# Patient Record
Sex: FEMALE | Race: WHITE | Employment: STUDENT | URBAN - METROPOLITAN AREA
[De-identification: names, ages, dates, MRNs, and addresses within clinical notes are randomized per-mention and may not be internally consistent; named-entity substitution may affect disease eponyms.]

---

## 2017-06-02 ENCOUNTER — HOSPITAL ENCOUNTER (OUTPATIENT)
Dept: GENERAL RADIOLOGY | Age: 22
Discharge: HOME | End: 2017-06-02

## 2017-06-02 ENCOUNTER — HOSPITAL ENCOUNTER (EMERGENCY)
Age: 22
Discharge: HOME | End: 2017-06-02
Attending: EMERGENCY MEDICINE
Payer: COMMERCIAL

## 2017-06-02 VITALS
HEIGHT: 64 IN | OXYGEN SATURATION: 98 % | TEMPERATURE: 98.1 F | HEART RATE: 67 BPM | BODY MASS INDEX: 26.8 KG/M2 | SYSTOLIC BLOOD PRESSURE: 128 MMHG | WEIGHT: 157 LBS | RESPIRATION RATE: 16 BRPM | DIASTOLIC BLOOD PRESSURE: 85 MMHG

## 2017-06-02 DIAGNOSIS — S92.215A CLOSED NONDISPLACED FRACTURE OF CUBOID OF LEFT FOOT, INITIAL ENCOUNTER: Primary | ICD-10-CM

## 2017-06-02 DIAGNOSIS — M79.672 LEFT FOOT PAIN: ICD-10-CM

## 2017-06-02 PROCEDURE — 99283 EMERGENCY DEPT VISIT LOW MDM: CPT

## 2017-06-02 PROCEDURE — 77030013175 HC SHOE PSTOP DJOR -A

## 2017-06-02 PROCEDURE — 73630 X-RAY EXAM OF FOOT: CPT

## 2017-06-02 PROCEDURE — L4360 PNEUMAT WALKING BOOT PRE CST: HCPCS

## 2017-06-02 RX ORDER — OXYCODONE HYDROCHLORIDE 5 MG/1
5 CAPSULE ORAL
Qty: 20 CAP | Refills: 0 | Status: SHIPPED | OUTPATIENT
Start: 2017-06-02 | End: 2017-06-07

## 2017-06-02 RX ORDER — DOCUSATE SODIUM 100 MG/1
100 CAPSULE, LIQUID FILLED ORAL 2 TIMES DAILY
Qty: 60 CAP | Refills: 0 | Status: SHIPPED | OUTPATIENT
Start: 2017-06-02 | End: 2017-07-02

## 2017-06-02 NOTE — DISCHARGE INSTRUCTIONS
We hope that we have addressed all of your medical concerns. The examination and treatment you received in the Emergency Department were for an emergent problem and were not intended as complete care. It is important that you follow up with your healthcare provider(s) for ongoing care. If your symptoms worsen or do not improve as expected, and you are unable to reach your usual health care provider(s), you should return to the Emergency Department. Today's healthcare is undergoing tremendous change, and patient satisfaction surveys are one of the many tools to assess the quality of medical care. You may receive a survey from the [employer] regarding your experience in the Emergency Department. I hope that your experience has been completely positive, particularly the medical care that I provided. As such, please participate in the survey; anything less than excellent does not meet my expectations or intentions. formerly [facility] and [address] [place] [address] participate in nationally recognized quality of care measures. If your blood pressure is greater than 120/80, as reported below, we urge that you seek medical care to address the potential of high blood pressure, commonly known as hypertension. Hypertension can be hereditary or can be caused by certain medical conditions, pain, stress, or \"white coat syndrome. \"       Please make an appointment with your health care provider(s) for follow up of your Emergency Department visit. VITALS:   Patient Vitals for the past 8 hrs:   Temp Pulse Resp BP SpO2   06/02/17 1303 98.1 °F (36.7 °C) 67 16 128/85 98 %          Thank you for allowing us to provide you with medical care today. We realize that you have many choices for your emergency care needs. Please choose us in the future for any continued health care needs. [place] Richard  DELIA/ Jaime [address]all[address], [address] [facility] [address]   Office: [phone]            No results found for this or any previous visit (from the past 24 hour(s)). Xr Foot Lt Min 3 V    Result Date: 6/2/2017  Indication: Left foot pain after injury pain mostly on lateral side. COMPARISON: None Three views of the left foot were performed. There is normal mineralization. Joint spaces are maintained. There is a linear lucency through the lateral aspect of the cuboid. This runs parallel to the length of the bone. This is most consistent with nondisplaced fracture. Dr. Gwen Jimenez office was telephoned. Tiny calcific density projects along the superior and medial aspect of the navicular bone that could be a small evulsion fracture or accessory ossicle. IMPRESSION: 1. Nondisplaced fracture through the lateral aspect of the cuboid bone. This fracture line extends to the joint space between the cuboid and fifth metatarsal. Please see above text. Dr. Jessica Martinez office was telephoned             Bones of the Foot: Anatomy Sketch    Current as of: January 5, 2017  Content Version: 11.2  © 3924-8819 [employer]. Care instructions adapted under license by [employer] (which disclaims liability or warranty for this information). If you have questions about a medical condition or this instruction, always ask your healthcare professional. [address] any warranty or liability for your use of this information. Broken Foot: Care Instructions  Your Care Instructions    A broken foot, or foot fracture, is a break in one or more of the bones in your foot. It may happen because of a sports injury, a fall, or other accident. A compound, or open, fracture occurs when a bone breaks through the skin. A break that does not poke through the skin is a closed fracture. Your treatment depends on the location and type of break in your foot. You may need a splint, a cast, or an orthopedic shoe. Certain kinds of injuries may need surgery at some time.  Whatever your treatment, you can ease symptoms and help your foot heal with care at home. You may need 6 to 8 weeks or more to fully heal.  You heal best when you take good care of yourself. Eat a variety of healthy foods, and don't smoke. Follow-up care is a key part of your treatment and safety. Be sure to make and go to all appointments, and call your doctor if you are having problems. It's also a good idea to know your test results and keep a list of the medicines you take. How can you care for yourself at home? · Be safe with medicines. Take pain medicines exactly as directed. ¨ If the doctor gave you a prescription medicine for pain, take it as prescribed. ¨ If you are not taking a prescription pain medicine, ask your doctor if you can take an over-the-counter medicine. · Leave the splint on until your follow-up appointment. Do not put any weight on the injured foot. If you were given crutches, use them as directed. · Put ice or a cold pack on your foot for 10 to 20 minutes at a time. Try to do this every 1 to 2 hours for the next 3 days (when you are awake) or until the swelling goes down. Put a thin cloth between the ice and your skin. · Prop up the sore foot on a pillow anytime you sit or lie down during the next 3 days. Try to keep it above the level of your heart. This will help reduce swelling. · Follow the cast care instructions your doctor gives you. If you have a splint, do not take it off unless your doctor tells you to. Cast and splint care  · If you have a removable splint, ask your doctor if it is okay to remove it to bathe. Your doctor may want you to keep it on as much as possible. · Keep your plaster splint covered by taping a sheet of plastic around it when you bathe. Water under the plaster can cause your skin to itch and hurt. · Never cut your splint. When should you call for help? Call 911 anytime you think you may need emergency care.  For example, call if:  · You have sudden chest pain and shortness of breath, or you cough up blood. Call your doctor now or seek immediate medical care if:  · You have increased or severe pain. · Your toes are cool or pale or change color. · You have tingling, weakness, or numbness in your foot. · Your cast or splint feels too tight. · You cannot move your toes. · You have signs of a blood clot, such as:  ¨ Pain in your calf, back of the knee, thigh, or groin. ¨ Redness or swelling in your leg or groin. Watch closely for changes in your health, and be sure to contact your doctor if:  · Your pain is not better in 2 to 3 days. Where can you learn more? Go to http://pari-jcaob.info/. Enter T575 in the search box to learn more about \"Broken Foot: Care Instructions. \"  Current as of: May 23, 2016  Content Version: 11.2  © 8057-3432 [employer]. Care instructions adapted under license by [employer] (which disclaims liability or warranty for this information). If you have questions about a medical condition or this instruction, always ask your healthcare professional. [place] [address] any warranty or liability for your use of this information.

## 2017-06-02 NOTE — CONSULTS
Ortho Phone consult:  Consulted by ED to review films and discuss treatment and f/u plans. Pt injured left foot in [place]. X-rays obtained here reveal a closed non-displaced left cuboid fracture. Recommend post op shoe or CAM boot walker and crutches. Follow up with Arvin Mccormick next week.   Ice and elevation, rest.

## 2017-06-05 NOTE — ED PROVIDER NOTES
HPI Comments: Alex Alvarenga is a 24 y.o. female who presents ambulatory with an unsteady gait to the ED with  c/o left foot pain. Patient was in [place] on vacation with her family when she was snow tubing. Patient states when she got to the bottom of the hill she \"hot her foot just right and it immediately began to swell. \" Patient wrapped in an ace wrap and flew back to [place], where she now presents for care. Patient states pain 10/10 currently when she walks on it. PCP: Linda Costa MD    PMHx significant for: History reviewed. No pertinent past medical history. PSHx significant for: History reviewed. No pertinent surgical history. Social Hx: Tobacco: none EtOH: none Illicit drug use: none    There are no further complaints or symptoms at this time. The history is provided by the patient. History reviewed. No pertinent past medical history. History reviewed. No pertinent surgical history. History reviewed. No pertinent family history. Social History     Social History    Marital status: SINGLE     Spouse name: N/A    Number of children: N/A    Years of education: N/A     Occupational History    Not on file. Social History Main Topics    Smoking status: Never Smoker    Smokeless tobacco: Not on file    Alcohol use No    Drug use: Not on file    Sexual activity: Not on file     Other Topics Concern    Not on file     Social History Narrative    No narrative on file         ALLERGIES: Review of patient's allergies indicates no known allergies. Review of Systems   Constitutional: Negative for activity change, chills and fever. HENT: Negative for congestion and ear pain. Eyes: Negative for pain and visual disturbance. Respiratory: Negative for chest tightness and shortness of breath. Cardiovascular: Negative for chest pain and palpitations. Gastrointestinal: Negative for abdominal distention, abdominal pain, nausea and vomiting. Genitourinary: Negative for difficulty urinating, frequency, menstrual problem and urgency. Musculoskeletal: Positive for joint swelling (left foot swelling, positive pulses noted in left foot). Negative for back pain, neck pain and neck stiffness. Skin: Negative for color change. Neurological: Negative for dizziness, syncope, weakness and headaches. Hematological: Does not bruise/bleed easily. Psychiatric/Behavioral: Negative for behavioral problems. The patient is not nervous/anxious. Vitals:    06/02/17 1303   BP: 128/85   Pulse: 67   Resp: 16   Temp: 98.1 °F (36.7 °C)   SpO2: 98%   Weight: 71.2 kg (157 lb)   Height: 5' 4\" (1.626 m)            Physical Exam   Constitutional: She is oriented to person, place, and time. She appears well-developed and well-nourished. No distress. HENT:   Head: Normocephalic and atraumatic. Right Ear: External ear normal.   Left Ear: External ear normal.   Eyes: Conjunctivae and EOM are normal. Pupils are equal, round, and reactive to light. Right eye exhibits no discharge. Left eye exhibits no discharge. Neck: Normal range of motion. Neck supple. No tracheal deviation present. No cervical spine pain on palpation. No neurological deficits   Cardiovascular: Normal rate, regular rhythm, normal heart sounds and intact distal pulses. No murmur heard. Pulmonary/Chest: Effort normal and breath sounds normal. No respiratory distress. She exhibits no tenderness. Abdominal: Soft. Bowel sounds are normal. She exhibits no distension. There is no tenderness. Musculoskeletal: She exhibits tenderness (left foot with dependent bruising, swelling and pain noted. ). She exhibits no edema. Neurological: She is alert and oriented to person, place, and time. Skin: Skin is warm and dry. Psychiatric: She has a normal mood and affect. Her behavior is normal.   Nursing note and vitals reviewed.        MDM  Number of Diagnoses or Management Options  Closed nondisplaced fracture of cuboid of left foot, initial encounter:   Diagnosis management comments: - left foot film  -pain control    Diagnosis:  - left cuboid fracture  - ortho consult: discharge to home in cam boot, follow up with [facility] within the week for definitive plan.          Amount and/or Complexity of Data Reviewed  Tests in the radiology section of CPT®: ordered and reviewed  Discuss the patient with other providers: yes (Discussed with Madeline Fernández.)      ED Course       Procedures

## 2018-12-21 ENCOUNTER — HOSPITAL ENCOUNTER (EMERGENCY)
Age: 23
Discharge: HOME | End: 2018-12-21
Attending: EMERGENCY MEDICINE | Admitting: EMERGENCY MEDICINE
Payer: COMMERCIAL

## 2018-12-21 ENCOUNTER — APPOINTMENT (OUTPATIENT)
Dept: GENERAL RADIOLOGY | Age: 23
End: 2018-12-21
Attending: EMERGENCY MEDICINE
Payer: COMMERCIAL

## 2018-12-21 VITALS
SYSTOLIC BLOOD PRESSURE: 125 MMHG | DIASTOLIC BLOOD PRESSURE: 73 MMHG | TEMPERATURE: 98 F | RESPIRATION RATE: 18 BRPM | OXYGEN SATURATION: 99 % | HEART RATE: 92 BPM

## 2018-12-21 DIAGNOSIS — S92.355A CLOSED NONDISPLACED FRACTURE OF FIFTH METATARSAL BONE OF LEFT FOOT, INITIAL ENCOUNTER: Primary | ICD-10-CM

## 2018-12-21 PROCEDURE — 99283 EMERGENCY DEPT VISIT LOW MDM: CPT

## 2018-12-21 PROCEDURE — 75810000053 HC SPLINT APPLICATION

## 2018-12-21 PROCEDURE — 73630 X-RAY EXAM OF FOOT: CPT

## 2018-12-21 RX ORDER — SERTRALINE HYDROCHLORIDE 25 MG/1
25 TABLET, FILM COATED ORAL DAILY
COMMUNITY

## 2018-12-22 NOTE — DISCHARGE INSTRUCTIONS
We hope that we have addressed all of your medical concerns. The examination and treatment you received in the Emergency Department were for an emergent problem and were not intended as complete care. It is important that you follow up with your healthcare provider(s) for ongoing care. If your symptoms worsen or do not improve as expected, and you are unable to reach your usual health care provider(s), you should return to the Emergency Department. Today's healthcare is undergoing tremendous change, and patient satisfaction surveys are one of the many tools to assess the quality of medical care. You may receive a survey from the [employer] organization regarding your experience in the Emergency Department. I hope that your experience has been completely positive, particularly the medical care that I provided. As such, please participate in the survey; anything less than excellent does not meet my expectations or intentions. [address] [place] [address] participate in nationally recognized quality of care measures. If your blood pressure is greater than 120/80, as reported below, we urge that you seek medical care to address the potential of high blood pressure, commonly known as hypertension. Hypertension can be hereditary or can be caused by certain medical conditions, pain, stress, or \"white coat syndrome. \"       Please make an appointment with your health care provider(s) for follow up of your Emergency Department visit. VITALS:   Patient Vitals for the past 8 hrs:   Temp Pulse Resp BP SpO2   12/21/18 2051 98 °F (36.7 °C) 92 18 125/73 99 %          Thank you for allowing us to provide you with medical care today. We realize that you have many choices for your emergency care needs. Please choose us in the future for any continued health care needs.       Loco Cardenas MD    [facility], [address] [facility]. Office: [phone]            No results found for this or any previous visit (from the past 24 hour(s)). Xr Foot Lt Min 3 V    Result Date: 12/21/2018  EXAM: XR FOOT LT MIN 3 V INDICATION: Trauma pain to fifth metatarsal. 6/2/2017 radiographs COMPARISON: None. FINDINGS: Three views of the left foot demonstrate a nondisplaced transverse fracture at the base of the fifth metatarsal bone. There is no demonstration of other bone, joint or soft tissue abnormality. .     IMPRESSION: Nondisplaced transverse fracture at base of fifth metatarsal bone. Fifth Metatarsal Stone Fracture: Care Instructions  Your Care Instructions    A fifth metatarsal fracture is a break or a thin, hairline crack in the long bone on the outside of the foot. A Stone fracture occurs near the end of this bone that is closest to the ankle. This type of fracture can happen when a person jumps or changes direction quickly and twists the foot or ankle the wrong way. Or it can happen from repeated stress on the bones of the foot. Treatment depends on how bad the fracture is. You may or may not have had surgery. Your doctor may have put your foot in a cast to keep it stable. A splint may be used in some cases if there is a lot of swelling. You may have been given crutches to use to keep weight off your foot. Your doctor may recommend that you keep weight off the foot for several weeks. The fracture may take 6 weeks to several months to heal. It is important to give your foot time to heal completely so that you don't hurt it again. Do not return to your usual activities until your doctor says you can. Your doctor may suggest that you get physical therapy to help regain strength and range of motion in your foot. You heal best when you take good care of yourself. Eat a variety of healthy foods, and don't smoke. Follow-up care is a key part of your treatment and safety.  Be sure to make and go to all appointments, and call your doctor if you are having problems. It's also a good idea to know your test results and keep a list of the medicines you take. How can you care for yourself at home? · Be safe with medicines. Read and follow all instructions on the label. ? If the doctor gave you a prescription medicine for pain, take it as prescribed. ? If you are not taking a prescription pain medicine, ask your doctor if you can take an over-the-counter medicine. · Follow your doctor's instructions about how much weight you can put on your foot and when you can go back to your usual activities. If you were given crutches, use them as directed. · Put ice or a cold pack on your foot for 10 to 20 minutes at a time. Try to do this every 1 to 2 hours for the next 3 days (when you are awake) or until the swelling goes down. Put a thin cloth between the ice and your skin. · Prop up your foot on a pillow when you ice it or anytime you sit or lie down for the next 3 days. Try to keep it above the level of your heart. This will help reduce swelling. Cast and splint care  · If your foot is in a cast or splint, follow the cast or splint care instructions your doctor gives you. If you have a removable fiberglass walking cast or a splint, do not take it off unless your doctor tells you to. · Keep your cast or splint dry. If you have a removable fiberglass walking cast or a splint, ask your doctor if it is okay to remove it to bathe. Your doctor may want you to keep it on as much as possible. · If you are told to keep your cast or splint on, tape a sheet of plastic to cover it when you bathe. Water under the cast or splint can cause your skin to itch and hurt. · Never cut your cast or stick anything down inside it to scratch an itch on your leg. When should you call for help? Call 911 anytime you think you may need emergency care. For example, call if:    · You have symptoms of a blood clot in your lung (called a pulmonary embolism).  These may include:  ? Sudden chest pain. ? Trouble breathing. ? Coughing up blood.     · You passed out (lost consciousness).    Call your doctor now or seek immediate medical care if:    · You have problems with your cast or splint. For example:  ? The skin under the cast or splint is burning or stinging. ? The cast or splint feels too tight. ? There is a lot of swelling near the cast or splint. (Some swelling is normal.)  ? You have a new fever. ? There is drainage or a bad smell coming from the cast or splint.     · You have increased or severe pain.     · You have tingling, weakness, or numbness in your foot and toes.     · You cannot move your toes.     · Your foot turns cold or changes color.    Watch closely for changes in your health, and be sure to contact your doctor if:    · The pain does not get better day by day.     · You do not get better as expected. Where can you learn more? Go to http://pari-jacob.info/. Enter B208 in the search box to learn more about \"Fifth Metatarsal Stone Fracture: Care Instructions. \"  Current as of: November 29, 2017  Content Version: 11.8  © 5163-8373 [employer]. Care instructions adapted under license by [employer] (which disclaims liability or warranty for this information). If you have questions about a medical condition or this instruction, always ask your healthcare professional. [place] [address] any warranty or liability for your use of this information.

## 2018-12-22 NOTE — ED NOTES
Splint applied by Paramedic. Pulse, movement and sensation noted prior to and after immobilization. Pt educated on proper crutch use. Demonstrated understanding.

## 2018-12-22 NOTE — ED PROVIDER NOTES
23 yo female presents with c/o left foot pain. Pain is throbbing Reports last night she rolled her foot while stepping off a curb. Thought she had sprained her foot but pain has persisted and bruising increased. She has been taking ibuprofen for pain. She has been walking but with a limp. Denies chance of pregnancy     Has seen dr mireya hope in the past             History reviewed. No pertinent past medical history. History reviewed. No pertinent surgical history. History reviewed. No pertinent family history. Social History     Socioeconomic History    Marital status: SINGLE     Spouse name: Not on file    Number of children: Not on file    Years of education: Not on file    Highest education level: Not on file   Social Needs    Financial resource strain: Not on file    Food insecurity - worry: Not on file    Food insecurity - inability: Not on file    Transportation needs - medical: Not on file   [employer] needs - non-medical: Not on file   Occupational History    Not on file   Tobacco Use    Smoking status: Never Smoker    Smokeless tobacco: Never Used   Substance and Sexual Activity    Alcohol use: Yes     Comment: social     Drug use: No    Sexual activity: Not on file   Other Topics Concern    Not on file   Social History Narrative    Not on file         ALLERGIES: Patient has no known allergies. Review of Systems   Constitutional: Negative for fever. Genitourinary:        Denies chance of pregnancy   Musculoskeletal:        Left foot pain   Neurological: Negative for weakness and numbness. All other systems reviewed and are negative. Vitals:    12/21/18 2051   BP: 125/73   Pulse: 92   Resp: 18   Temp: 98 °F (36.7 °C)   SpO2: 99%            Physical Exam   Constitutional: She is oriented to person, place, and time. She appears well-developed and well-nourished. HENT:   Head: Normocephalic and atraumatic.    Eyes: Conjunctivae are normal.   Neck: Normal range of motion. Cardiovascular: Normal rate, regular rhythm and normal heart sounds. Pulmonary/Chest: Effort normal and breath sounds normal. No stridor. No respiratory distress. She has no wheezes. She has no rales. Abdominal: Soft. Bowel sounds are normal. She exhibits no distension and no mass. There is no tenderness. There is no guarding. Musculoskeletal: Normal range of motion. She exhibits edema and tenderness. Left foot with swelling and ecchymosis to lateral aspect; palpable dp and pt pulse; sensation and motor intact    Neurological: She is alert and oriented to person, place, and time. Skin: Skin is warm and dry. Nursing note and vitals reviewed. MDM  Number of Diagnoses or Management Options  Closed nondisplaced fracture of fifth metatarsal bone of left foot, initial encounter:   Diagnosis management comments: concer nfor 5th metatarsal fx vs contusion- xray  Pt declined pain medication        Amount and/or Complexity of Data Reviewed  Tests in the radiology section of CPT®: ordered and reviewed  Obtain history from someone other than the patient: yes (family)    Patient Progress  Patient progress: stable         Procedures      Pt splinted posterior with stirrup. Discussed toe touch weight bearing only and need for follow up for boot placement with ortho. Patient's results have been reviewed with them. Patient and/or family have verbally conveyed their understanding and agreement of the patient's signs, symptoms, diagnosis, treatment and prognosis and additionally agree to follow up as recommended or return to the Emergency Room should their condition change prior to follow-up. Discharge instructions have also been provided to the patient with some educational information regarding their diagnosis as well a list of reasons why they would want to return to the ER prior to their follow-up appointment should their condition change.

## 2018-12-22 NOTE — ED TRIAGE NOTES
Pt arrived via walk in for complaint of left foot pain secondary to fall from curb that occurred yesterday around 1600. Pt reports edema and pain at site. Broke foot in same place approximately 1.5 years ago.

## 2018-12-22 NOTE — ED NOTES
The patient was discharged home by Heather Mak in stable condition. The patient is alert and oriented, in no respiratory distress and discharge vital signs obtained. The patient's diagnosis, condition and treatment were explained. The patient expressed understanding. Prescriptions given. No work/school note given. A discharge plan has been developed. A  was not involved in the process. Aftercare instructions were given. Pt ambulatory out of the ED with crutches. All personal belongings, prescriptions, and discharge papers in hand upon departure from ED.

## 2018-12-22 NOTE — ED NOTES
Pt back from xray. Placed in position of comfort and ice applied to affected area. Pulse, movement and sensation in tact.

## 2020-03-29 NOTE — ED TRIAGE NOTES
Pt reports she broke her foot 5 days ago while is [place] snow tubing. Pt had foot x rayed in [place] and was told she has a Cuboid fracture. Pt has noted swelling and bruising to toes and bottom of foot. Pt flew home yesterday. No